# Patient Record
Sex: FEMALE | Race: WHITE | NOT HISPANIC OR LATINO | URBAN - METROPOLITAN AREA
[De-identification: names, ages, dates, MRNs, and addresses within clinical notes are randomized per-mention and may not be internally consistent; named-entity substitution may affect disease eponyms.]

---

## 2023-06-13 ENCOUNTER — OFFICE VISIT (OUTPATIENT)
Dept: POSTPARTUM | Facility: CLINIC | Age: 33
End: 2023-06-13

## 2023-06-13 VITALS — DIASTOLIC BLOOD PRESSURE: 82 MMHG | SYSTOLIC BLOOD PRESSURE: 108 MMHG | HEART RATE: 96 BPM

## 2023-06-13 NOTE — PATIENT INSTRUCTIONS
Try to decrease how much you collect pumping at night  Decrease by a little every few nights  Use cool or cold compresses and take ibuprofen as needed for any feeling of engorgement

## 2023-06-13 NOTE — PROGRESS NOTES
INITIAL BREAST FEEDING EVALUATION    Informant/Relationship: Chaparro/mom and dad    Discussion of General Lactation Issues: Kadi Rogel struggled with nursing early on  He [facility] Neema [facility] and started syringe feeding some expressed milk before discharge  Etta Dela was concerned that Kadi Rogel wasn't attaching well  He fell asleep easily and didn't do any real sucking, so she started with bottles of EBM  Initially she was pumping only twice daily, but was meeting James's needs  Neema developed gall bladder issues and had her gall bladder removed  At this time, nursing staff recommended more frequent pumping and Neeam sought assistance from an [employer]  The IBCLC, Deep Ryder, gave some assistance with positioning and attachment  Nursing improved, and despite some short lived pain, Kadi Rogel has continued to nurse mostly without difficulties  At one point, Etta Deal was on steroids for an allergic reaction and James's weight gain slowed  Since stopping the steroids, his weight gain has recovered  Deep Ryder has recommended continued nursing, but suggested that Kadi Rogel might be checked for any tongue restrictions  Infant is 3months 3week old today          History:  Fertility Problem:no  Breast changes:yes - sore in the beginning and again at the end, got a little bigger; areolae and nipples got bigger and darker  : c/s failed induction, failed epidural  Full term:yes - 40 2   labor:no  First nursing/attempt < 1 hour after birth:yes - within 1 hour, in PACU, with shield  Skin to skin following delivery:yes - immediately with dad; started with mom within an hour  Breast changes after delivery:yes - saw changes in the pump; 5 days  Rooming in (infant in room with mother with exception of procedures, eg  Circumcision: went to the nursry for a short time after the circ for maternal rest  Blood sugar issues:no  NICU stay:no  Jaundice:no  Phototherapy:no  Supplement given: (list supplement and method used as well as reason(s):yes - MOM via syringe    No past medical history on file  Current Outpatient Medications:   •  Prenatal Vit-Fe Fumarate-FA (PRENATAL PO), Take by mouth, Disp: , Rfl:     Allergies   Allergen Reactions   • Cefaclor Hives   • Estrogel [Estradiol] Throat Swelling       Social History     Substance and Sexual Activity   Drug Use Never       Social History     Interval Breastfeeding History:    Frequency of breast feeding: about every 2 5 hours during the day, slept 12 hours, had been doing dream feeds  Does mother feel breastfeeding is effective: Yes; not so much earlier, but now things are great  Does infant appear satisfied after nursing:Yes  Stooling pattern normal: lYes  Urinating frequently:Yes  Using shield or shells: Tried, but never really used    Alternative/Artificial Feedings:   Bottle: Yes, occasionally, paced feeding  Cup: No  Syringe/Finger: No           Formula Type: n/a                     Amount: n/a            Breast Milk:                      Amount: 4 oz when given bottle            Frequency Q 2 5 Hr between feedings  Elimination Problems: No      Equipment:  Nipple Shield             Type: n/a             Size: n/a             Frequency of Use: n/a  Pump            Type: Spectra            Frequency of Use: in the morning, to empty the breast he doesn't nurse on, once before bed; averages 10+ ounces to store  Shells            Type: n/a            Frequency of use: n/a    Equipment Problems: no    Mom:  Breast: Large, slightly pendulous  Nipple Assessment in General: Normal: elongated/eraser, no discoloration and no damage noted  Mother's Awareness of Feeding Cues                 Recognizes:  Yes                  Verbalizes: Yes  Support System: FOB  History of Breastfeeding: none  Changes/Stressors/Violence: Gaurang Billy is concerned about whether Sindi Canales has a tongue tie that is of concern and whether he needs a frenotomy  Concerns/Goals: Neema plans to nurse for as long as she can    Problems with Mom: None    Physical Exam  Constitutional:       Appearance: Normal appearance  She is well-developed  She is obese  HENT:      Head: Normocephalic and atraumatic  Eyes:      Extraocular Movements: Extraocular movements intact  Neck:      Thyroid: No thyromegaly  Cardiovascular:      Rate and Rhythm: Normal rate and regular rhythm  Pulses: Normal pulses  Heart sounds: Normal heart sounds  No murmur heard  Pulmonary:      Effort: Pulmonary effort is normal       Breath sounds: Normal breath sounds  Musculoskeletal:      Cervical back: Normal range of motion and neck supple  Lymphadenopathy:      Cervical: No cervical adenopathy  Upper Body:      Right upper body: No pectoral adenopathy  Left upper body: No pectoral adenopathy  Neurological:      General: No focal deficit present  Mental Status: She is alert and oriented to person, place, and time  Psychiatric:         Mood and Affect: Mood normal          Behavior: Behavior normal          Thought Content: Thought content normal          Judgment: Judgment normal    Vitals and nursing note reviewed           Infant:  Behaviors: Alert  Color: Healthy  Birth weight: unknown  Current weight: 7 13 kg    Problems with infant: Restricted tongue movement      General Appearance:  Alert, active, no distress                             Head:  Normocephalic, AFOF, sutures opposed                             Eyes:  Conjunctiva clear, no drainage                              Ears:  Normally placed, no anomolies                             Nose:  Septum intact, no drainage or erythema                           Mouth:  No lesions; high arched palate; tongue does not extend to lower lip, tip of the tongue does not lateralize well with slight rolling of the body; brief sucking attempt on the examiner's finger demonstrates good cupping and peristalsis; frenulum is broad and merges into a broad web across the floor of the mouth                    Neck:  Supple, symmetrical, trachea midline, no adenopathy; thyroid: no enlargement, symmetric, no tenderness/mass/nodules                 Respiratory:  No grunting, flaring, retractions, breath sounds clear and equal            Cardiovascular:  Regular rate and rhythm  No murmur  Adequate perfusion/capillary refill  Femoral pulse present                    Abdomen:   Soft, non-tender, no masses, bowel sounds present, no HSM             Genitourinary:  Normal male, testes descended, no discharge, swelling, or pain, anus patent                          Spine:   No abnormalities noted        Musculoskeletal:  Full range of motion          Skin/Hair/Nails:   Skin warm, dry, and intact, no rashes or abnormal dyspigmentation or lesions                Neurologic:   No abnormal movement, tone appropriate for gestational age    Wadley Latch:  Efficiency:               Lips Flanged: Yes              Depth of latch: Excellent              Audible Swallow: Yes              Visible Milk: Yes              Wide Open/ Asymmetrical: Yes              Suck Swallow Cycle: Breathing: Unlabored, Coordinated: Yes  Nipple Assessment after latch: Normal: elongated/eraser, no discoloration and no damage noted  Latch Problems: None; Maureen Luis attaches easily with his lips flanged in a wide, asymmetrical position    Position:  Infant's Ergonomics/Body               Body Alignment: Yes               Head Supported: Yes               Close to Mom's body/ Lifted/ Supported: Yes               Mom's Ergonomics/Body: Yes                           Supported:  Yes                           Sitting Back: Yes                           Brings Baby to her breast: Yes  Positioning Problems: None        Education:  Reviewed Frequency/Supply & Demand: Continue to feed on demand  Reviewed Alternative/Artificial Feedings: Paced bottle "feeding  Reviewed Mom/Breast care: Discussed appropriate pumping, to meet or slightly exceed baby's needs; Reviewed the use of cool compresses and anti-inflammatories to address engorgement issues        Plan:  Discussed history and physical exams with Larisa Luke  Support given for her commitment to providing breast milk for her baby  Discussed the findings on the baby's exam consistent with tongue tie and reviewed how this may be the cause of nipple trauma, nipple pain, nipple damage, poor milk transfer, blocked ducts, mastitis, and loss of milk production  Discussed the science that supports performing the frenotomy to improve latch  Nursing is going well for both Neema and her baby with no history of breast or nipple pain or damage since the first few weeks  Reviewed current pumping schedule and discussed how to begin to decrease milk production above infant's need to ease Neema's schedule  Reassured Marge Rodriguez and her  that collecting some additional milk allows for the flexibility of separations if desired or needed, but that there does not need to be large [facility]  Additional lactation support remains available  I have spent 40 minutes with Patient and family today in which greater than 50% of this time was spent in counseling/coordination of care regarding Prognosis, Risks and benefits of tx options, Instructions for management, Patient and family education, Risk factor reductions, Impressions, Counseling / Coordination of care, Documenting in the medical record, Reviewing / ordering tests, medicine, procedures   and Obtaining or reviewing history            "